# Patient Record
Sex: FEMALE | Race: WHITE
[De-identification: names, ages, dates, MRNs, and addresses within clinical notes are randomized per-mention and may not be internally consistent; named-entity substitution may affect disease eponyms.]

---

## 2017-12-27 ENCOUNTER — HOSPITAL ENCOUNTER (OUTPATIENT)
Dept: HOSPITAL 95 - ORSCMMR | Age: 72
LOS: 1 days | Discharge: HOME | End: 2017-12-28
Payer: MEDICARE

## 2017-12-27 VITALS — WEIGHT: 159.99 LBS | HEIGHT: 60.98 IN | BODY MASS INDEX: 30.21 KG/M2

## 2017-12-27 DIAGNOSIS — F17.210: ICD-10-CM

## 2017-12-27 DIAGNOSIS — Z79.899: ICD-10-CM

## 2017-12-27 DIAGNOSIS — I10: ICD-10-CM

## 2017-12-27 DIAGNOSIS — Z79.82: ICD-10-CM

## 2017-12-27 DIAGNOSIS — N13.1: Primary | ICD-10-CM

## 2017-12-27 PROCEDURE — C1769 GUIDE WIRE: HCPCS

## 2017-12-27 PROCEDURE — C2617 STENT, NON-COR, TEM W/O DEL: HCPCS

## 2017-12-28 PROCEDURE — 0TQ34ZZ REPAIR RIGHT KIDNEY PELVIS, PERCUTANEOUS ENDOSCOPIC APPROACH: ICD-10-PCS | Performed by: UROLOGY

## 2018-02-26 ENCOUNTER — HOSPITAL ENCOUNTER (OUTPATIENT)
Dept: HOSPITAL 95 - ORSCMMR | Age: 73
Discharge: HOME | End: 2018-02-26
Payer: MEDICARE

## 2018-02-26 VITALS — HEIGHT: 60.98 IN | BODY MASS INDEX: 30.78 KG/M2 | WEIGHT: 163.01 LBS

## 2018-02-26 DIAGNOSIS — C7A.025: ICD-10-CM

## 2018-02-26 DIAGNOSIS — K62.1: ICD-10-CM

## 2018-02-26 DIAGNOSIS — K21.9: ICD-10-CM

## 2018-02-26 DIAGNOSIS — Z12.11: Primary | ICD-10-CM

## 2018-02-26 DIAGNOSIS — E78.00: ICD-10-CM

## 2018-02-26 DIAGNOSIS — F17.210: ICD-10-CM

## 2018-02-26 DIAGNOSIS — Z79.82: ICD-10-CM

## 2018-02-26 DIAGNOSIS — D12.0: ICD-10-CM

## 2018-02-26 DIAGNOSIS — Z79.899: ICD-10-CM

## 2018-02-26 DIAGNOSIS — I10: ICD-10-CM

## 2018-02-26 DIAGNOSIS — C7A.026: ICD-10-CM

## 2018-02-26 DIAGNOSIS — I25.10: ICD-10-CM

## 2018-02-26 DIAGNOSIS — K63.5: ICD-10-CM

## 2018-02-26 DIAGNOSIS — F32.9: ICD-10-CM

## 2018-02-26 PROCEDURE — 0DBN8ZX EXCISION OF SIGMOID COLON, VIA NATURAL OR ARTIFICIAL OPENING ENDOSCOPIC, DIAGNOSTIC: ICD-10-PCS | Performed by: INTERNAL MEDICINE

## 2018-02-26 PROCEDURE — 0DBP8ZX EXCISION OF RECTUM, VIA NATURAL OR ARTIFICIAL OPENING ENDOSCOPIC, DIAGNOSTIC: ICD-10-PCS | Performed by: INTERNAL MEDICINE

## 2018-02-26 PROCEDURE — 0DBL8ZX EXCISION OF TRANSVERSE COLON, VIA NATURAL OR ARTIFICIAL OPENING ENDOSCOPIC, DIAGNOSTIC: ICD-10-PCS | Performed by: INTERNAL MEDICINE

## 2018-02-26 PROCEDURE — 0DBH8ZX EXCISION OF CECUM, VIA NATURAL OR ARTIFICIAL OPENING ENDOSCOPIC, DIAGNOSTIC: ICD-10-PCS | Performed by: INTERNAL MEDICINE

## 2018-04-10 ENCOUNTER — HOSPITAL ENCOUNTER (EMERGENCY)
Dept: HOSPITAL 95 - ER | Age: 73
Discharge: HOME | End: 2018-04-10
Payer: MEDICARE

## 2018-04-10 VITALS — BODY MASS INDEX: 21.67 KG/M2 | HEIGHT: 72 IN | WEIGHT: 159.99 LBS

## 2018-04-10 DIAGNOSIS — S76.312A: Primary | ICD-10-CM

## 2018-04-10 DIAGNOSIS — Z88.2: ICD-10-CM
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
## 2018-04-10 DIAGNOSIS — Z79.899: ICD-10-CM

## 2018-04-10 DIAGNOSIS — M79.652: ICD-10-CM

## 2018-04-10 DIAGNOSIS — F17.200: ICD-10-CM

## 2018-04-10 DIAGNOSIS — Z79.82: ICD-10-CM

## 2018-04-10 DIAGNOSIS — Z88.5: ICD-10-CM

## 2018-04-10 DIAGNOSIS — I10: ICD-10-CM

## 2018-04-10 DIAGNOSIS — Z88.8: ICD-10-CM

## 2019-03-18 ENCOUNTER — HOSPITAL ENCOUNTER (OUTPATIENT)
Dept: HOSPITAL 95 - ORSCMMR | Age: 74
Discharge: HOME | End: 2019-03-18
Attending: INTERNAL MEDICINE
Payer: MEDICARE

## 2019-03-18 VITALS — BODY MASS INDEX: 30.58 KG/M2 | HEIGHT: 60.98 IN | WEIGHT: 162 LBS

## 2019-03-18 DIAGNOSIS — F17.210: ICD-10-CM

## 2019-03-18 DIAGNOSIS — C91.10: ICD-10-CM

## 2019-03-18 DIAGNOSIS — C7A.026: Primary | ICD-10-CM

## 2019-03-18 DIAGNOSIS — K63.5: ICD-10-CM

## 2019-03-18 DIAGNOSIS — D12.3: ICD-10-CM

## 2019-03-18 DIAGNOSIS — Z79.899: ICD-10-CM

## 2019-03-18 DIAGNOSIS — Z79.82: ICD-10-CM

## 2019-03-18 DIAGNOSIS — K62.1: ICD-10-CM

## 2019-03-18 DIAGNOSIS — D12.2: ICD-10-CM

## 2019-03-18 DIAGNOSIS — I25.10: ICD-10-CM

## 2019-03-18 DIAGNOSIS — I10: ICD-10-CM

## 2019-03-18 PROCEDURE — 0DBM8ZX EXCISION OF DESCENDING COLON, VIA NATURAL OR ARTIFICIAL OPENING ENDOSCOPIC, DIAGNOSTIC: ICD-10-PCS | Performed by: INTERNAL MEDICINE

## 2019-03-18 PROCEDURE — 0DBL8ZX EXCISION OF TRANSVERSE COLON, VIA NATURAL OR ARTIFICIAL OPENING ENDOSCOPIC, DIAGNOSTIC: ICD-10-PCS | Performed by: INTERNAL MEDICINE

## 2019-03-18 PROCEDURE — 0DBN8ZX EXCISION OF SIGMOID COLON, VIA NATURAL OR ARTIFICIAL OPENING ENDOSCOPIC, DIAGNOSTIC: ICD-10-PCS | Performed by: INTERNAL MEDICINE

## 2019-03-18 PROCEDURE — 0DBP8ZX EXCISION OF RECTUM, VIA NATURAL OR ARTIFICIAL OPENING ENDOSCOPIC, DIAGNOSTIC: ICD-10-PCS | Performed by: INTERNAL MEDICINE

## 2019-03-18 PROCEDURE — 0DBK8ZX EXCISION OF ASCENDING COLON, VIA NATURAL OR ARTIFICIAL OPENING ENDOSCOPIC, DIAGNOSTIC: ICD-10-PCS | Performed by: INTERNAL MEDICINE

## 2019-03-18 NOTE — NUR
Discharge instructions reviewed with patient. Patient verbalizes understanding.
Copy given to patient to take home.
Patient States Post-Procedure ride home has been arranged.

## 2019-03-18 NOTE — NUR
03/18/19 0805 Elke Hendrix
History, Chart, Medications and Allergies reviewed before start of
procedure. PATIENT CONFIRMS NPO STATUS AND AGREES WITH SCHEDULED
PROCEDURE. MONITOR INTACT WITH CONTINUOUS PULSE OXIMETRY AND
INTERMITTENT BP. PATIENT DETERMINED TO BE ASA APPROPRIATE FOR PROPOFOL
SEDATION PRIOR TO START OF PROCEDURE BY DR. MORRIS. O2 VIA N/C INTACT
THROUGHOUT SEDATION/PROCEDURE. 3-LEAD EKG REVIEWED WITH PHYSICIAN
PRIOR TO START OF PROCEDURE.

## 2019-03-18 NOTE — NUR
History, Chart, Medications and Allergies reviewed before start of
procedure. Patient confirms NPO status and agrees with scheduled surgery.
Reports taking all of colon prep with clear results.
Patient States Post-Procedure ride home has been arranged with her son, Deshawn.

## 2019-07-11 ENCOUNTER — HOSPITAL ENCOUNTER (OUTPATIENT)
Dept: HOSPITAL 95 - ORSCMMR | Age: 74
Discharge: HOME | End: 2019-07-11
Attending: INTERNAL MEDICINE
Payer: MEDICARE

## 2019-07-11 VITALS — HEIGHT: 55 IN | WEIGHT: 160.06 LBS

## 2019-07-11 DIAGNOSIS — I25.10: ICD-10-CM

## 2019-07-11 DIAGNOSIS — Z79.82: ICD-10-CM

## 2019-07-11 DIAGNOSIS — K22.70: Primary | ICD-10-CM

## 2019-07-11 DIAGNOSIS — K25.4: ICD-10-CM

## 2019-07-11 DIAGNOSIS — Z79.899: ICD-10-CM

## 2019-07-11 DIAGNOSIS — F17.210: ICD-10-CM

## 2019-07-11 DIAGNOSIS — K21.9: ICD-10-CM

## 2019-07-11 LAB
BASOPHILS # BLD AUTO: 0.05 K/MM3 (ref 0–0.23)
BASOPHILS NFR BLD AUTO: 1 % (ref 0–2)
DEPRECATED RDW RBC AUTO: 51.1 FL (ref 35.1–46.3)
EOSINOPHIL # BLD AUTO: 0.28 K/MM3 (ref 0–0.68)
EOSINOPHIL NFR BLD AUTO: 3 % (ref 0–6)
ERYTHROCYTE [DISTWIDTH] IN BLOOD BY AUTOMATED COUNT: 15.6 % (ref 11.7–14.2)
HCT VFR BLD AUTO: 35.1 % (ref 33–51)
HGB BLD-MCNC: 11.1 G/DL (ref 11.5–16)
IMM GRANULOCYTES # BLD AUTO: 0.01 K/MM3 (ref 0–0.1)
IMM GRANULOCYTES NFR BLD AUTO: 0 % (ref 0–1)
LYMPHOCYTES # BLD AUTO: 3.52 K/MM3 (ref 0.84–5.2)
LYMPHOCYTES NFR BLD AUTO: 40 % (ref 21–46)
MCHC RBC AUTO-ENTMCNC: 31.6 G/DL (ref 31.5–36.5)
MCV RBC AUTO: 90 FL (ref 80–100)
MONOCYTES # BLD AUTO: 0.56 K/MM3 (ref 0.16–1.47)
MONOCYTES NFR BLD AUTO: 6 % (ref 4–13)
NEUTROPHILS # BLD AUTO: 4.49 K/MM3 (ref 1.96–9.15)
NEUTROPHILS NFR BLD AUTO: 50 % (ref 41–73)
NRBC # BLD AUTO: 0 K/MM3 (ref 0–0.02)
NRBC BLD AUTO-RTO: 0 /100 WBC (ref 0–0.2)
PLATELET # BLD AUTO: 194 K/MM3 (ref 150–400)

## 2019-07-11 PROCEDURE — 0DB58ZX EXCISION OF ESOPHAGUS, VIA NATURAL OR ARTIFICIAL OPENING ENDOSCOPIC, DIAGNOSTIC: ICD-10-PCS | Performed by: INTERNAL MEDICINE

## 2019-07-11 PROCEDURE — 0W3P8ZZ CONTROL BLEEDING IN GASTROINTESTINAL TRACT, VIA NATURAL OR ARTIFICIAL OPENING ENDOSCOPIC: ICD-10-PCS | Performed by: INTERNAL MEDICINE

## 2019-07-11 PROCEDURE — 0DB68ZX EXCISION OF STOMACH, VIA NATURAL OR ARTIFICIAL OPENING ENDOSCOPIC, DIAGNOSTIC: ICD-10-PCS | Performed by: INTERNAL MEDICINE

## 2019-07-11 PROCEDURE — 0DB48ZX EXCISION OF ESOPHAGOGASTRIC JUNCTION, VIA NATURAL OR ARTIFICIAL OPENING ENDOSCOPIC, DIAGNOSTIC: ICD-10-PCS | Performed by: INTERNAL MEDICINE

## 2019-07-11 NOTE — NUR
PT TO [facility] FOR RECOVERY APPROX 2 MINUTES AGO. AWAKE, ALERT, CONVERSING WITH
STAFF. REQUESTS COFFEE TO DRINK. NO C/O PAIN OR DISCOMFORT. NO NAUSEA. FAMILY
MEMBER AT BEDSIDE.

## 2019-07-11 NOTE — NUR
BLOOD HAS BEEN DRAWN. REVIEWED DISCHARGE INSTRUCTIONS WITH PATIENT AND SON,
BOTH OF WHOM VERBALIZE UNDERSTANDING OF ALL INSTRUCTIONS GIVEN. PT HAS NOT
TAKEN BP MEDICATION TODAY - INSTRUCTED TO TAKE AS SOON AS SHE GETS HOME.
AGREES TO DO SO. ASYMPTOMATIC RELATED TO ELEVATED BP. IV DC TIP INTACT AND PT
DRIVEN HOME BY SON.

## 2019-07-11 NOTE — NUR
07/11/19 0857 Jr Saunders
PATIENT DETERMINED TO BE ASA APPROPRIATE FOR PROPOFOL SEDATION PRIOR
TO START OF PROCEDURE BY DR. Oneida Barajas Placed3-LEAD EKG REVIEWED WITH
PHYSICIAN PRIOR TO START OF PROCEDURE.Patient to ENDO 1History, Chart,
Medications and Allergies reviewed before start of procedure.
HURRICAINE SPRAY TO OROPHARYX.MONITOR INTACT WITH CONTINUOUS PULSE
OXIMETRY AND INTERMITTENT BP.O2 VIA N/C INTACT THROUGHOUT
SEDATION/PROCEDURE.